# Patient Record
Sex: FEMALE | Race: WHITE | Employment: FULL TIME | URBAN - METROPOLITAN AREA
[De-identification: names, ages, dates, MRNs, and addresses within clinical notes are randomized per-mention and may not be internally consistent; named-entity substitution may affect disease eponyms.]

---

## 2021-05-02 ENCOUNTER — APPOINTMENT (OUTPATIENT)
Dept: CT IMAGING | Age: 31
DRG: 812 | End: 2021-05-02
Payer: MEDICAID

## 2021-05-02 ENCOUNTER — APPOINTMENT (OUTPATIENT)
Dept: GENERAL RADIOLOGY | Age: 31
DRG: 812 | End: 2021-05-02
Payer: MEDICAID

## 2021-05-02 ENCOUNTER — HOSPITAL ENCOUNTER (INPATIENT)
Age: 31
LOS: 1 days | Discharge: LEFT BEFORE DISCHARGE | DRG: 812 | End: 2021-05-03
Attending: EMERGENCY MEDICINE | Admitting: INTERNAL MEDICINE
Payer: MEDICAID

## 2021-05-02 DIAGNOSIS — R11.2 NAUSEA VOMITING AND DIARRHEA: ICD-10-CM

## 2021-05-02 DIAGNOSIS — R19.7 NAUSEA VOMITING AND DIARRHEA: ICD-10-CM

## 2021-05-02 DIAGNOSIS — R79.89 ELEVATED LACTIC ACID LEVEL: ICD-10-CM

## 2021-05-02 DIAGNOSIS — F19.10 DRUG ABUSE (HCC): Primary | ICD-10-CM

## 2021-05-02 DIAGNOSIS — R00.0 TACHYCARDIA: ICD-10-CM

## 2021-05-02 DIAGNOSIS — K82.8 THICKENING OF WALL OF GALLBLADDER: ICD-10-CM

## 2021-05-02 LAB
A/G RATIO: 1.7 (ref 1.1–2.2)
ALBUMIN SERPL-MCNC: 4.3 G/DL (ref 3.4–5)
ALP BLD-CCNC: 60 U/L (ref 40–129)
ALT SERPL-CCNC: 32 U/L (ref 10–40)
AMPHETAMINE SCREEN, URINE: POSITIVE
ANION GAP SERPL CALCULATED.3IONS-SCNC: 11 MMOL/L (ref 3–16)
AST SERPL-CCNC: 30 U/L (ref 15–37)
BACTERIA: ABNORMAL /HPF
BARBITURATE SCREEN URINE: ABNORMAL
BASOPHILS ABSOLUTE: 0 K/UL (ref 0–0.2)
BASOPHILS RELATIVE PERCENT: 0.2 %
BENZODIAZEPINE SCREEN, URINE: ABNORMAL
BILIRUB SERPL-MCNC: 1 MG/DL (ref 0–1)
BILIRUBIN URINE: NEGATIVE
BLOOD, URINE: NEGATIVE
BUN BLDV-MCNC: 27 MG/DL (ref 7–20)
CALCIUM SERPL-MCNC: 8.6 MG/DL (ref 8.3–10.6)
CANNABINOID SCREEN URINE: ABNORMAL
CHLORIDE BLD-SCNC: 102 MMOL/L (ref 99–110)
CLARITY: ABNORMAL
CO2: 28 MMOL/L (ref 21–32)
COCAINE METABOLITE SCREEN URINE: ABNORMAL
COLOR: YELLOW
COMMENT UA: ABNORMAL
CREAT SERPL-MCNC: 1 MG/DL (ref 0.6–1.1)
EOSINOPHILS ABSOLUTE: 0 K/UL (ref 0–0.6)
EOSINOPHILS RELATIVE PERCENT: 0.3 %
EPITHELIAL CELLS, UA: 5 /HPF (ref 0–5)
GFR AFRICAN AMERICAN: >60
GFR NON-AFRICAN AMERICAN: >60
GLOBULIN: 2.6 G/DL
GLUCOSE BLD-MCNC: 89 MG/DL (ref 70–99)
GLUCOSE URINE: NEGATIVE MG/DL
HCG QUALITATIVE: NEGATIVE
HCT VFR BLD CALC: 38.4 % (ref 36–48)
HEMOGLOBIN: 13.6 G/DL (ref 12–16)
HYALINE CASTS: 4 /LPF (ref 0–8)
KETONES, URINE: NEGATIVE MG/DL
LEUKOCYTE ESTERASE, URINE: ABNORMAL
LIPASE: 43 U/L (ref 13–60)
LYMPHOCYTES ABSOLUTE: 0.3 K/UL (ref 1–5.1)
LYMPHOCYTES RELATIVE PERCENT: 6.4 %
Lab: ABNORMAL
MCH RBC QN AUTO: 32.2 PG (ref 26–34)
MCHC RBC AUTO-ENTMCNC: 35.4 G/DL (ref 31–36)
MCV RBC AUTO: 91.1 FL (ref 80–100)
METHADONE SCREEN, URINE: ABNORMAL
MICROSCOPIC EXAMINATION: YES
MONOCYTES ABSOLUTE: 0 K/UL (ref 0–1.3)
MONOCYTES RELATIVE PERCENT: 0.6 %
NEUTROPHILS ABSOLUTE: 3.7 K/UL (ref 1.7–7.7)
NEUTROPHILS RELATIVE PERCENT: 92.5 %
NITRITE, URINE: NEGATIVE
OPIATE SCREEN URINE: ABNORMAL
OXYCODONE URINE: ABNORMAL
PDW BLD-RTO: 13 % (ref 12.4–15.4)
PH UA: 6
PH UA: 6 (ref 5–8)
PHENCYCLIDINE SCREEN URINE: ABNORMAL
PLATELET # BLD: 232 K/UL (ref 135–450)
PMV BLD AUTO: 7 FL (ref 5–10.5)
POTASSIUM SERPL-SCNC: 4.6 MMOL/L (ref 3.5–5.1)
PROPOXYPHENE SCREEN: ABNORMAL
PROTEIN UA: 100 MG/DL
RBC # BLD: 4.21 M/UL (ref 4–5.2)
RBC UA: ABNORMAL /HPF (ref 0–4)
SODIUM BLD-SCNC: 141 MMOL/L (ref 136–145)
SPECIFIC GRAVITY UA: 1.01 (ref 1–1.03)
TOTAL CK: 64 U/L (ref 26–192)
TOTAL PROTEIN: 6.9 G/DL (ref 6.4–8.2)
TROPONIN: <0.01 NG/ML
URINE REFLEX TO CULTURE: YES
URINE TYPE: ABNORMAL
UROBILINOGEN, URINE: 1 E.U./DL
WBC # BLD: 4.3 K/UL (ref 4–11)
WBC UA: 24 /HPF (ref 0–5)

## 2021-05-02 PROCEDURE — 87077 CULTURE AEROBIC IDENTIFY: CPT

## 2021-05-02 PROCEDURE — 99284 EMERGENCY DEPT VISIT MOD MDM: CPT

## 2021-05-02 PROCEDURE — 85025 COMPLETE CBC W/AUTO DIFF WBC: CPT

## 2021-05-02 PROCEDURE — 2580000003 HC RX 258: Performed by: PHYSICIAN ASSISTANT

## 2021-05-02 PROCEDURE — 74177 CT ABD & PELVIS W/CONTRAST: CPT

## 2021-05-02 PROCEDURE — 84703 CHORIONIC GONADOTROPIN ASSAY: CPT

## 2021-05-02 PROCEDURE — 82550 ASSAY OF CK (CPK): CPT

## 2021-05-02 PROCEDURE — 96375 TX/PRO/DX INJ NEW DRUG ADDON: CPT

## 2021-05-02 PROCEDURE — 80307 DRUG TEST PRSMV CHEM ANLYZR: CPT

## 2021-05-02 PROCEDURE — 71260 CT THORAX DX C+: CPT

## 2021-05-02 PROCEDURE — 80053 COMPREHEN METABOLIC PANEL: CPT

## 2021-05-02 PROCEDURE — 87086 URINE CULTURE/COLONY COUNT: CPT

## 2021-05-02 PROCEDURE — 83690 ASSAY OF LIPASE: CPT

## 2021-05-02 PROCEDURE — 6360000004 HC RX CONTRAST MEDICATION: Performed by: PHYSICIAN ASSISTANT

## 2021-05-02 PROCEDURE — 93005 ELECTROCARDIOGRAM TRACING: CPT | Performed by: PHYSICIAN ASSISTANT

## 2021-05-02 PROCEDURE — 81001 URINALYSIS AUTO W/SCOPE: CPT

## 2021-05-02 PROCEDURE — 84484 ASSAY OF TROPONIN QUANT: CPT

## 2021-05-02 PROCEDURE — 71045 X-RAY EXAM CHEST 1 VIEW: CPT

## 2021-05-02 PROCEDURE — 6360000002 HC RX W HCPCS: Performed by: PHYSICIAN ASSISTANT

## 2021-05-02 PROCEDURE — 87186 SC STD MICRODIL/AGAR DIL: CPT

## 2021-05-02 RX ORDER — ONDANSETRON 2 MG/ML
4 INJECTION INTRAMUSCULAR; INTRAVENOUS ONCE
Status: COMPLETED | OUTPATIENT
Start: 2021-05-02 | End: 2021-05-02

## 2021-05-02 RX ORDER — 0.9 % SODIUM CHLORIDE 0.9 %
1000 INTRAVENOUS SOLUTION INTRAVENOUS ONCE
Status: COMPLETED | OUTPATIENT
Start: 2021-05-02 | End: 2021-05-03

## 2021-05-02 RX ORDER — LORAZEPAM 2 MG/ML
1 INJECTION INTRAMUSCULAR ONCE
Status: COMPLETED | OUTPATIENT
Start: 2021-05-02 | End: 2021-05-03

## 2021-05-02 RX ADMIN — SODIUM CHLORIDE 1000 ML: 9 INJECTION, SOLUTION INTRAVENOUS at 22:36

## 2021-05-02 RX ADMIN — ONDANSETRON 4 MG: 2 INJECTION INTRAMUSCULAR; INTRAVENOUS at 22:36

## 2021-05-03 ENCOUNTER — APPOINTMENT (OUTPATIENT)
Dept: CT IMAGING | Age: 31
DRG: 812 | End: 2021-05-03
Payer: MEDICAID

## 2021-05-03 VITALS
DIASTOLIC BLOOD PRESSURE: 52 MMHG | WEIGHT: 128.31 LBS | BODY MASS INDEX: 20.62 KG/M2 | TEMPERATURE: 97.4 F | RESPIRATION RATE: 18 BRPM | SYSTOLIC BLOOD PRESSURE: 89 MMHG | OXYGEN SATURATION: 97 % | HEIGHT: 66 IN | HEART RATE: 118 BPM

## 2021-05-03 PROBLEM — I95.9 HYPOTENSION: Status: ACTIVE | Noted: 2021-05-03

## 2021-05-03 LAB
EKG ATRIAL RATE: 124 BPM
EKG DIAGNOSIS: NORMAL
EKG P AXIS: 84 DEGREES
EKG P-R INTERVAL: 124 MS
EKG Q-T INTERVAL: 296 MS
EKG QRS DURATION: 72 MS
EKG QTC CALCULATION (BAZETT): 425 MS
EKG R AXIS: 83 DEGREES
EKG T AXIS: 0 DEGREES
EKG VENTRICULAR RATE: 124 BPM
LACTIC ACID: 1.7 MMOL/L (ref 0.4–2)
LACTIC ACID: 2.5 MMOL/L (ref 0.4–2)
LACTIC ACID: 2.8 MMOL/L (ref 0.4–2)
LACTIC ACID: 4.5 MMOL/L (ref 0.4–2)

## 2021-05-03 PROCEDURE — 6360000002 HC RX W HCPCS: Performed by: PHYSICIAN ASSISTANT

## 2021-05-03 PROCEDURE — 96375 TX/PRO/DX INJ NEW DRUG ADDON: CPT

## 2021-05-03 PROCEDURE — G0378 HOSPITAL OBSERVATION PER HR: HCPCS

## 2021-05-03 PROCEDURE — 96366 THER/PROPH/DIAG IV INF ADDON: CPT

## 2021-05-03 PROCEDURE — 96361 HYDRATE IV INFUSION ADD-ON: CPT

## 2021-05-03 PROCEDURE — 87040 BLOOD CULTURE FOR BACTERIA: CPT

## 2021-05-03 PROCEDURE — 2060000000 HC ICU INTERMEDIATE R&B

## 2021-05-03 PROCEDURE — 2580000003 HC RX 258: Performed by: PHYSICIAN ASSISTANT

## 2021-05-03 PROCEDURE — 2580000003 HC RX 258: Performed by: INTERNAL MEDICINE

## 2021-05-03 PROCEDURE — 83605 ASSAY OF LACTIC ACID: CPT

## 2021-05-03 PROCEDURE — 6360000004 HC RX CONTRAST MEDICATION: Performed by: PHYSICIAN ASSISTANT

## 2021-05-03 PROCEDURE — 70450 CT HEAD/BRAIN W/O DYE: CPT

## 2021-05-03 PROCEDURE — 6360000002 HC RX W HCPCS: Performed by: INTERNAL MEDICINE

## 2021-05-03 PROCEDURE — 96365 THER/PROPH/DIAG IV INF INIT: CPT

## 2021-05-03 PROCEDURE — 96372 THER/PROPH/DIAG INJ SC/IM: CPT

## 2021-05-03 PROCEDURE — 87150 DNA/RNA AMPLIFIED PROBE: CPT

## 2021-05-03 PROCEDURE — 93010 ELECTROCARDIOGRAM REPORT: CPT | Performed by: INTERNAL MEDICINE

## 2021-05-03 PROCEDURE — 36415 COLL VENOUS BLD VENIPUNCTURE: CPT

## 2021-05-03 RX ORDER — POLYETHYLENE GLYCOL 3350 17 G/17G
17 POWDER, FOR SOLUTION ORAL DAILY PRN
Status: DISCONTINUED | OUTPATIENT
Start: 2021-05-03 | End: 2021-05-03 | Stop reason: HOSPADM

## 2021-05-03 RX ORDER — ONDANSETRON 2 MG/ML
4 INJECTION INTRAMUSCULAR; INTRAVENOUS EVERY 4 HOURS PRN
Status: DISCONTINUED | OUTPATIENT
Start: 2021-05-03 | End: 2021-05-03 | Stop reason: HOSPADM

## 2021-05-03 RX ORDER — SODIUM CHLORIDE 9 MG/ML
INJECTION, SOLUTION INTRAVENOUS CONTINUOUS
Status: DISCONTINUED | OUTPATIENT
Start: 2021-05-03 | End: 2021-05-03 | Stop reason: HOSPADM

## 2021-05-03 RX ORDER — SODIUM CHLORIDE 0.9 % (FLUSH) 0.9 %
10 SYRINGE (ML) INJECTION EVERY 12 HOURS SCHEDULED
Status: DISCONTINUED | OUTPATIENT
Start: 2021-05-03 | End: 2021-05-03 | Stop reason: HOSPADM

## 2021-05-03 RX ORDER — ACETAMINOPHEN 325 MG/1
650 TABLET ORAL EVERY 4 HOURS PRN
Status: DISCONTINUED | OUTPATIENT
Start: 2021-05-03 | End: 2021-05-03 | Stop reason: HOSPADM

## 2021-05-03 RX ORDER — SODIUM CHLORIDE 9 MG/ML
25 INJECTION, SOLUTION INTRAVENOUS PRN
Status: DISCONTINUED | OUTPATIENT
Start: 2021-05-03 | End: 2021-05-03 | Stop reason: HOSPADM

## 2021-05-03 RX ORDER — ACETAMINOPHEN 650 MG/1
650 SUPPOSITORY RECTAL EVERY 4 HOURS PRN
Status: DISCONTINUED | OUTPATIENT
Start: 2021-05-03 | End: 2021-05-03 | Stop reason: HOSPADM

## 2021-05-03 RX ORDER — 0.9 % SODIUM CHLORIDE 0.9 %
1000 INTRAVENOUS SOLUTION INTRAVENOUS ONCE
Status: COMPLETED | OUTPATIENT
Start: 2021-05-03 | End: 2021-05-03

## 2021-05-03 RX ORDER — NICOTINE 21 MG/24HR
1 PATCH, TRANSDERMAL 24 HOURS TRANSDERMAL DAILY
Status: DISCONTINUED | OUTPATIENT
Start: 2021-05-03 | End: 2021-05-03 | Stop reason: HOSPADM

## 2021-05-03 RX ORDER — SODIUM CHLORIDE 0.9 % (FLUSH) 0.9 %
10 SYRINGE (ML) INJECTION PRN
Status: DISCONTINUED | OUTPATIENT
Start: 2021-05-03 | End: 2021-05-03 | Stop reason: HOSPADM

## 2021-05-03 RX ADMIN — PIPERACILLIN SODIUM AND TAZOBACTAM SODIUM 3375 MG: 3; .375 INJECTION, POWDER, FOR SOLUTION INTRAVENOUS at 01:32

## 2021-05-03 RX ADMIN — SODIUM CHLORIDE 1000 ML: 9 INJECTION, SOLUTION INTRAVENOUS at 02:57

## 2021-05-03 RX ADMIN — LORAZEPAM 1 MG: 2 INJECTION INTRAMUSCULAR; INTRAVENOUS at 00:28

## 2021-05-03 RX ADMIN — SODIUM CHLORIDE 1000 ML: 9 INJECTION, SOLUTION INTRAVENOUS at 00:28

## 2021-05-03 RX ADMIN — ENOXAPARIN SODIUM 40 MG: 40 INJECTION SUBCUTANEOUS at 09:20

## 2021-05-03 RX ADMIN — SODIUM CHLORIDE: 9 INJECTION, SOLUTION INTRAVENOUS at 06:36

## 2021-05-03 RX ADMIN — PIPERACILLIN AND TAZOBACTAM 3375 MG: 3; .375 INJECTION, POWDER, LYOPHILIZED, FOR SOLUTION INTRAVENOUS at 09:20

## 2021-05-03 RX ADMIN — IOPAMIDOL 75 ML: 755 INJECTION, SOLUTION INTRAVENOUS at 00:00

## 2021-05-03 ASSESSMENT — ENCOUNTER SYMPTOMS
NAUSEA: 1
VOMITING: 1
COLOR CHANGE: 1
ABDOMINAL PAIN: 1
DIARRHEA: 1
SHORTNESS OF BREATH: 1

## 2021-05-03 ASSESSMENT — PAIN SCALES - GENERAL: PAINLEVEL_OUTOF10: 0

## 2021-05-03 NOTE — H&P
Hospital Medicine History & Physical      PCP: No primary care provider on file. Date of Admission: 5/2/2021    Date of Service: Pt seen/examined on 5/3/21 and Admitted to Inpatient     Chief Complaint: Nausea and vomiting    History Of Present Illness: The patient is a 27 y.o. female who presents to [facility] with nausea and vomiting. Patient states that yesterday she injected ice and shortly thereafter started to have nausea vomiting along with diarrhea. She denies getting ice from a new source. She also denies using any sort of opioids at that moment. She says her last use of IV heroin was 7 days ago. In the emergency department patient was noted to be afebrile, regular respirations, tachycardic with episodes of hypotension despite IV fluid resuscitation. Patient's lactic acid elevated at 4.5 and trended down to 1.7 after 3 L of fluid. Urine drug screen was positive for amphetamines. Urinalysis concerning for possible UTI. CT of the head without any acute pathology. CT of the chest abdomen and pelvis with rectal wall thickening along with a sending colon wall thickening, thickened appearance of the gallbladder wall, thickening of the distal esophagus. Negative for any lung pathology. Patient was admitted into the hospital and started on broad-spectrum IV antibiotics along with IV fluids. Past Medical History:    History reviewed. No pertinent past medical history. Past Surgical History:    History reviewed. No pertinent surgical history. Medications Prior to Admission:    Prior to Admission medications    Medication Sig Start Date End Date Taking? Authorizing Provider   etonogestrel (NEXPLANON) 68 MG implant 68 mg by Subdermal route once    Yes Historical Provider, MD       Allergies:  Patient has no known allergies. Social History:   The interpretation: No acute pathology    CT of the chest personally reviewed without any acute pathology    CT of the abdomen and pelvis personally reviewed with thickening of the rectum    EKG:  I have reviewed the EKG with the following interpretation: Sinus tachycardia with possible left atrial enlargement, voltage criteria for LVH    CBC   Recent Labs     05/02/21 2233   WBC 4.3   HGB 13.6   HCT 38.4         RENAL  Recent Labs     05/02/21 2233      K 4.6      CO2 28   BUN 27*   CREATININE 1.0     LFT'S  Recent Labs     05/02/21 2233   AST 30   ALT 32   BILITOT 1.0   ALKPHOS 60     COAG  No results for input(s): INR in the last 72 hours.   CARDIAC ENZYMES  Recent Labs     05/02/21 2233   CKTOTAL 64   TROPONINI <0.01       U/A:    Lab Results   Component Value Date    COLORU YELLOW 05/02/2021    WBCUA 24 05/02/2021    RBCUA 0-2 05/02/2021    MUCUS Trace 02/21/2011    BACTERIA 2+ 05/02/2021    CLARITYU CLOUDY 05/02/2021    SPECGRAV 1.009 05/02/2021    LEUKOCYTESUR SMALL 05/02/2021    BLOODU Negative 05/02/2021    GLUCOSEU Negative 05/02/2021    GLUCOSEU NEGATIVE 02/21/2011       ABG  No results found for: ENZ4XTI, BEART, B3DWYEXH, PHART, THGBART, EPM3UKI, PO2ART, XMU6FZV      PHYSICIANS CERTIFICATION:    I certify that Mary Amador is expected to be hospitalized for more than 2 midnights based on the following assessment and plan:      ASSESSMENT/PLAN:    Nausea vomiting, diarrhea  Possible drug reaction from methamphetamines or opioid withdrawal  Could also be infectious process  Continue supportive care with IV fluids  Antinausea meds  CT with colonic wall thickening, esophageal thickening  GI consulted    SIRS   continue IV fluids  No source of infection    Methamphetamine intoxication  Resolving  Continue supportive care    Abnormal urinalysis  No signs or symptoms  Monitor      DVT Prophylaxis: Lovenox  Diet: DIET GENERAL;  Code Status: Full Code  PT/OT Eval Status: Not applicable    Dispo -inpatient       Cinthia Mixon MD    Thank you No primary care provider on file. for the opportunity to be involved in this patient's care. If you have any questions or concerns please feel free to contact me at [phone].

## 2021-05-03 NOTE — PROGRESS NOTES
Patient arrived to room 5103 from ED. Pt ambulated over to the bed. Telemetry applied to patient and verified with [facility]. Box #32. Vital signs taken, view flow sheets. Patient alert and oriented x4. Patient oriented to room and use of call light. Patient is able to get up ad joe but was advised to call if she feels uncomfortable to do so at any point. Call light and personal belongings in reach. Bed alarm on. Plan of care reviewed with patient. Patient denied any further needs and questions at this time. Will continue to monitor.      Electronically signed by Addie Osman RN on 5/3/2021 at 6:10 AM

## 2021-05-03 NOTE — ED NOTES
Pt presents to ED via EMS from home for complaints of chest pain and nausea. Pt reports that she injected meth about an hour prior to arrival when she started with symptoms. Pt also it a heroin user and last use was 7 days ago. Pt is alert and oriented x4. Sinus tach, HR 140s on monitor, /89, 99% on room air. Safety precautions in place. Call light within reach.       Abdi Echeverria RN  05/02/21 0497

## 2021-05-03 NOTE — ED PROVIDER NOTES
vomiting. Musculoskeletal: Positive for arthralgias and myalgias. Skin: Positive for color change (Mottled arms and legs). Neurological: Positive for headaches. Negative for weakness and numbness. Psychiatric/Behavioral: Negative for agitation and behavioral problems. The patient is nervous/anxious. Except as noted above the remainder of the review of systems was reviewed and negative. PAST MEDICAL HISTORY   History reviewed. No pertinent past medical history. SURGICAL HISTORY     History reviewed. No pertinent surgical history. CURRENT MEDICATIONS       Previous Medications    No medications on file       ALLERGIES     Patient has no known allergies. FAMILY HISTORY     History reviewed. No pertinent family history. No family status information on file. SOCIAL HISTORY      reports that she has been smoking. She has never used smokeless tobacco. She reports previous alcohol use. She reports current drug use. Drugs: IV, Methamphetamines, and Opiates . PHYSICAL EXAM    (up to 7 for level 4, 8 or more for level 5)     ED Triage Vitals [05/02/21 2146]   BP Temp Temp src Pulse Resp SpO2 Height Weight   (!) 83/43 98 °F (36.7 °C) -- 117 16 98 % -- --       Physical Exam  Vitals signs and nursing note reviewed. Constitutional:       Appearance: Normal appearance. HENT:      Head: Normocephalic. Eyes:      Pupils: Pupils are equal, round, and reactive to light. Cardiovascular:      Rate and Rhythm: Tachycardia present. Pulses: Normal pulses. Pulmonary:      Effort: No respiratory distress. Abdominal:      Tenderness: There is abdominal tenderness. There is no guarding. Musculoskeletal: Normal range of motion. Skin:     Capillary Refill: Capillary refill takes more than 3 seconds. Neurological:      General: No focal deficit present. Mental Status: She is alert and oriented to person, place, and time.    Psychiatric:         Mood and Affect: Mood normal. Behavior: Behavior normal.         DIAGNOSTIC RESULTS     EKG: All EKG's are interpreted by MOSES Cheng in the absence of a cardiologist.    EKG interpreted by myself - please refer to attending physician's note for complete EKG interpretation:    No evidence of acute ischemia or injury. RADIOLOGY:   Non-plain film images such as CT, Ultrasound and MRI are read by the radiologist. Plain radiographic images are visualized and preliminarily interpreted by MOSES Cheng with the below findings:    Reviewed radiologist's interpretation. Interpretation per the Radiologist below, if available at the time of this note:    CT HEAD WO CONTRAST   Final Result   No acute intracranial abnormality. CT ABDOMEN PELVIS W IV CONTRAST Additional Contrast? None   Final Result   1. Wall thickening of the rectum and ascending colon. Correlate with   presentation for colitis. 2. Nonspecific periportal edema. 3. Thickened appearance of the gallbladder wall. Correlate with presentation   to exclude cholangitis. 4. Questionable wall thickening of the distal esophagus. Correlate with   presentation. 5. Other findings as described. CT CHEST PULMONARY EMBOLISM W CONTRAST   Final Result   1. Suboptimal opacification of the pulmonary arteries. Artifact degraded   evaluation of the pulmonary arteries. No acute pulmonary embolism to the   segmental arteries. 2. Other findings as described. XR CHEST PORTABLE   Final Result   No airspace disease by radiograph.                LABS:  Labs Reviewed   CBC WITH AUTO DIFFERENTIAL - Abnormal; Notable for the following components:       Result Value    Lymphocytes Absolute 0.3 (*)     All other components within normal limits    Narrative:     Performed at:  [address] [address]   Phone [phone]   COMPREHENSIVE METABOLIC PANEL - Abnormal; Notable for the following components: BUN 27 (*)     All other components within normal limits    Narrative:     Performed at:  [facility]  [address] [place] [employer] [employer] [address]   Phone [phone]   URINE RT REFLEX TO CULTURE - Abnormal; Notable for the following components:    Clarity, UA CLOUDY (*)     Protein,  (*)     Leukocyte Esterase, Urine SMALL (*)     All other components within normal limits    Narrative:     Performed at:  [place] [address], [place] [employer] [employer] [address]   Phone [phone]   URINE DRUG SCREEN - Abnormal; Notable for the following components:    Amphetamine Screen, Urine POSITIVE (*)     All other components within normal limits    Narrative:     Performed at:  [address] [employer] [employer] [address]   Phone [phone]   MICROSCOPIC URINALYSIS - Abnormal; Notable for the following components:    Bacteria, UA 2+ (*)     WBC, UA 24 (*)     All other components within normal limits    Narrative:     Performed at:  [place] [address], [place] [employer] [employer] [address]   Phone [phone]   LACTIC ACID, PLASMA - Abnormal; Notable for the following components:    Lactic Acid 4.5 (*)     All other components within normal limits    Narrative:     Arvin Hatch,  Chemistry result called to Merline Shrestha RN, 05/03/2021 01:49, by [facility]  Performed at:  [facility]  [address] [employer] [address]   Phone [phone]   LACTIC ACID, PLASMA - Abnormal; Notable for the following components:    Lactic Acid 2.5 (*)     All other components within normal limits    Narrative:     Performed at:  [place] [address], [place] [employer] [employer] [address]   Phone [phone]   CULTURE, URINE   CULTURE, BLOOD 2   CULTURE, BLOOD 1   LIPASE    Narrative:     Performed at:  [facility]  [address] [place] [facility] [facility] [address]   Phone [phone]   TROPONIN    Narrative:     Performed at:  [address] [place] [address]  [address] [facility] [address]   Phone [phone]   HCG, SERUM, QUALITATIVE    Narrative:     Performed at:  [facility]  [address] [place] [address]   Phone [phone]   CK    Narrative:     Performed at:  [address] [place] [address]  [address] [facility] [address]   Phone [phone]       All other labs were within normal range or not returned as of this dictation. EMERGENCY DEPARTMENT COURSE and DIFFERENTIAL DIAGNOSIS/MDM:   Vitals:    Vitals:    05/03/21 0131 05/03/21 0147 05/03/21 0218 05/03/21 0231   BP: 121/77 116/64 (!) 108/57 127/63   Pulse: 134 135 128 126   Resp:       Temp:       SpO2: 99%  99% 100%     Patient is tachycardic, initially hypotensive but blood pressure came up to 108 without fluids. She was given fluids and ativan but continued to be tachycardic. She has possible colitis and gallbladder thickening on ct scan. Given Zosyn and will consult the hospitalist.    CONSULTS:  IP CONSULT TO HOSPITALIST  IP CONSULT TO GI    PROCEDURES:  Procedures      FINAL IMPRESSION      1. Drug abuse ([address]    2. Tachycardia    3. Nausea vomiting and diarrhea    4. Thickening of wall of gallbladder    5. Elevated lactic acid level          DISPOSITION/PLAN   DISPOSITION        PATIENT REFERRED TO:  No follow-up provider specified.     DISCHARGE MEDICATIONS:  New Prescriptions    No medications on file       (Please note that portions of this note were completed with a voice recognition program.  Efforts were made to edit the dictations but occasionally words are mis-transcribed.)    Nancy Adams, [address], [place]  05/03/21 8366

## 2021-05-03 NOTE — ED PROVIDER NOTES
Attending Supervisory Note/Shared Visit   I have personally performed a face to face diagnostic evaluation on this patient. I have reviewed the mid-levels findings and agree. History and Exam by me shows alert white female no acute distress. Patient is here after shooting methamphetamine about an hour and a half ago. She is having nausea, diarrhea, headache, chest and abdominal pain. She states she is feeling cold. Heart: Tachycardic, regular, no murmurs or gallops noted. Lungs: Breath sounds equal bilaterally and clear. Abdomen: Soft, nondistended, some mild diffuse periumbilical tenderness without guarding or rebound. Bowel sounds are normal.  Skin: Warm and dry. Mottling of her distal extremities with some poor capillary refill in her distal upper and lower extremities. EKG: Sinus tachycardia, rate of 124, left atrial enlargement, left ventricular hypertrophy. Nonspecific ST-T wave changes. Rhythm strip shows a sinus tachycardia with a rate of 124, AZ interval 124 ms, QRS 72 ms with no other ectopy as interpreted by me. No old EKG available for comparison. Lab reviewed. H&H are 13.6 and 38.4. White blood cell count 4300 with 93 neutrophils and 6 lymphs. Electrolytes are normal.  BUN of 27 with a creatinine 1.0. Liver enzymes are normal.  Lipase of 43. hCG is negative. CK of 64.       (Please note that portions of this note were completed with a voice recognition program.  Efforts were made to edit the dictations but occasionally words are mis-transcribed.)    Ryan Edwards MD  Attending Emergency Physician        Marlee Kocher, MD  05/02/21 3143

## 2021-05-03 NOTE — CONSULTS
GASTROENTEROLOGY INPATIENT CONSULTATION          IDENTIFYING DATA/REASON FOR CONSULTATION   PATIENT:  Cesia Hawthorne Code  MRN:  [id number]  ADMIT DATE: 5/2/2021  TIME OF EVALUATION: 5/3/2021 12:21 PM  HOSPITAL STAY:   LOS: 0 days     REASON FOR CONSULTATION:  N/V/Abd pain, Abnormal CT    HISTORY OF PRESENT ILLNESS   Cesia Hawthorne Code is a 27 y.o. female with no past medical history who presented on 5/2/2021 with acute onset of nausea, vomiting, abdominal pain, body aches starting shortly after injecting methamphetamine. Onset was acute, occurring 10 to 20 minutes after injection. She states the pain was throughout her entire abdomen. Denies blood in her vomit or stools. In the ED she was found to be hypotensive and tachycardic. CT A/P with IV contrast showed wall thickening of the rectum, ascending colon, distal esophagus. There was also a thickened appearance of the gallbladder wall. Blood work showed initial lactic acid of 4.5 which has since resolved. LFTs, lipase, white count normal.        PAST MEDICAL, SURGICAL, FAMILY, and SOCIAL HISTORY   History reviewed. No pertinent past medical history. History reviewed. No pertinent surgical history. History reviewed. No pertinent family history.   Social History     Socioeconomic History    Marital status: Single     Spouse name: None    Number of children: None    Years of education: None    Highest education level: None   Occupational History    None   Social Needs    Financial resource strain: None    Food insecurity     Worry: None     Inability: None    Transportation needs     Medical: None     Non-medical: None   Tobacco Use    Smoking status: Current Every Day Smoker    Smokeless tobacco: Never Used   Substance and Sexual Activity    Alcohol use: Not Currently    Drug use: Yes     Types: IV, Methamphetamines, Opiates     Sexual activity: None   Lifestyle    Physical activity     Days per week: None     Minutes per session: None    Stress: IMAGING   Laboratory   Recent Labs     05/02/21 2233   WBC 4.3   HGB 13.6   HCT 38.4   MCV 91.1        Recent Labs     05/02/21 2233      K 4.6      CO2 28   BUN 27*   CREATININE 1.0     Recent Labs     05/02/21 2233   AST 30   ALT 32   BILITOT 1.0   ALKPHOS 60     Recent Labs     05/02/21 2233   LIPASE 43.0     No results for input(s): PROTIME, INR in the last 72 hours. Imaging  CT HEAD WO CONTRAST   Final Result   No acute intracranial abnormality. CT ABDOMEN PELVIS W IV CONTRAST Additional Contrast? None   Final Result   1. Wall thickening of the rectum and ascending colon. Correlate with   presentation for colitis. 2. Nonspecific periportal edema. 3. Thickened appearance of the gallbladder wall. Correlate with presentation   to exclude cholangitis. 4. Questionable wall thickening of the distal esophagus. Correlate with   presentation. 5. Other findings as described. CT CHEST PULMONARY EMBOLISM W CONTRAST   Final Result   1. Suboptimal opacification of the pulmonary arteries. Artifact degraded   evaluation of the pulmonary arteries. No acute pulmonary embolism to the   segmental arteries. 2. Other findings as described. XR CHEST PORTABLE   Final Result   No airspace disease by radiograph. US GALLBLADDER RUQ    (Results Pending)         ASSESSMENT AND RECOMMENDATIONS   27 y.o. female with a history of IV drug use who presented 5/2/2021 acute onset of nausea, vomiting, abdominal pain, body aches starting shortly after injecting methamphetamine. CT A/P with IV contrast showed wall thickening of the rectum, ascending colon, distal esophagus. There was also a thickened appearance of the gallbladder wall. Blood work showed initial lactic acid of 4.5 which has since resolved. LFTs, lipase, white count normal.     IMPRESSION:  1. Nausea, vomiting, abdominal pain  -Suspect this is related to drug overdose.   She was hypotensive on arrival.  CT showed rectal and ascending colon wall thickening and blood work showed elevated lactic acid. Suspect these findings due to a possible ischemic event in the setting of hypotension and drug overdose. There was also possible distal esophageal wall thickening seen on CT which could be related to the vomiting. There ws also gallbladder wall thickening and a right upper quadrant ultrasound has been ordered for follow up. LFTs are normal.  She has been placed on empiric Zosyn        RECOMMENDATIONS:    Follow-up on right upper quadrant ultrasound She has no further nausea vomiting today. She states she is hungry and wants to eat. Lactic acid resolved. Will discuss case with Dr. Carmen Mcmanus. Please await his further input and recommendations    If you have any questions or need any further information, please feel free to contact our consult team.  Thank you for allowing us to participate in the care of Jessica Hooper Dr. The note was completed using Dragon voice recognition transcription. Every effort was made to ensure accuracy; however, inadvertent transcription errors may be present despite my best efforts to edit errors.       Liz Duval PA-C

## 2021-05-03 NOTE — CARE COORDINATION
SW consult acknowledged. INITIAL CASE MANAGEMENT ASSESSMENT     Reviewed chart, spoke with patient and significant other to assess possible discharge needs. Explained Case Management role/services. Living Situation: Patient lives in a house with significant other; 4 steps to enter. ADLs: Independent      DME: None     PT/OT Recs: Not ordered      Active Services: None      Transportation: Patient is an active ; significant other to transport home. Medications: [facility] on [address]    PCP: No PCP. Patient declined     HD/PD: n/a    PLAN/COMMENTS: Patient's significant other was asleep in the chair at bedside during discussion. Patient appeared minimally engaged in discussion and kept one eye open at all times. SW to follow up with patient with resources and speak with patient individually to verify plan. 1) Clinic list   2) Food resources - per RN     SW/CM provided contact information for patient or family to call with any questions. SW/CM will follow and assist as needed.     AMAURY Rangel, [facility], Social Work  [phone]  Electronically signed by AMAURY Rangel, [facility] on 5/3/2021 at 12:48 PM

## 2021-05-03 NOTE — PROGRESS NOTES
4 Eyes Skin Assessment     NAME:  Zena Zelaya Code  YOB: 1990  MEDICAL RECORD NUMBER:  [id number]    The patient is being assess for  Admission    I agree that 2 RN's have performed a thorough Head to Toe Skin Assessment on the patient. ALL assessment sites listed below have been assessed. Areas assessed by both nurses:    Head, Face, Ears, Shoulders, Back, Chest, Arms, Elbows, Hands, Sacrum. Buttock, Coccyx, Ischium and Legs. Feet and Heels        Does the Patient have a Wound?  No noted wound(s)       Saurabh Prevention initiated:  NA   Wound Care Orders initiated:  NA    Pressure Injury (Stage 3,4, Unstageable, DTI, NWPT, and Complex wounds) if present place consult order under [de-identified] NA    New and Established Ostomies if present place consult order under : NA      Nurse 1 eSignature: Electronically signed by Chloe Ortega RN on 5/3/21 at 6:08 AM EDT    **SHARE this note so that the co-signing nurse is able to place an eSignature**    Nurse 2 eSignature: Electronically signed by Mason Starks on 5/3/21 at 7:56 AM EDT

## 2021-05-03 NOTE — PROGRESS NOTES
Patient leaving AMA. Tele and IV removed. Explained that it is against medical advice. Plans to ambulate out.

## 2021-05-03 NOTE — PROGRESS NOTES
Comprehensive Nutrition Assessment    Type and Reason for Visit:  Initial, Positive Nutrition Screen    Nutrition Recommendations/Plan:   General diet   Ensure BID  Will monitor nutritional adequacy, nutrition-related labs, weights, BMs, and clinical progress     Nutrition Assessment:  + Screen MST 2. Pt presented with nausea, vomiting, diarrhea after injecting \"ice\" per H&P. Attempted to see patient, drowsy during visit and did not provide much history. Currently on General diet, no intakes recorded. No weights available in EMR to monitor trends. Will trial ONS. Malnutrition Assessment:  Malnutrition Status:  Insufficient data      Estimated Daily Nutrient Needs:  Energy (kcal):  3091-9692 kcal (25-30 kcal/kg ABW); Weight Used for Energy Requirements:  Current     Protein (g):  58-70 gm (1-1.2 gm/kg ABW); Weight Used for Protein Requirements:  Current        Fluid (ml/day):   ; Method Used for Fluid Requirements:  1 ml/kcal      Nutrition Related Findings:  no edema      Wounds:  None       Current Nutrition Therapies:    DIET GENERAL; Anthropometric Measures:  · Height: 5' 6\" (167.6 cm)  · Current Body Weight: 128 lb (58.1 kg)   · Admission Body Weight: 128 lb (58.1 kg)    · Usual Body Weight: (ASHLEY)     · Ideal Body Weight: 130 lbs; % Ideal Body Weight 98.5 %   · BMI: 20.7  · Adjusted Body Weight:  ; No Adjustment   · BMI Categories: Normal Weight (BMI 18.5-24. 9)       Nutrition Diagnosis:   · Inadequate oral intake related to altered GI function as evidenced by intake 0-25%      Nutrition Interventions:   Food and/or Nutrient Delivery:  Continue Current Diet, Start Oral Nutrition Supplement  Nutrition Education/Counseling:  No recommendation at this time   Coordination of Nutrition Care:  Continue to monitor while inpatient    Goals:   Tolerate diet and consume greater than 50% of meals and supplements this admission       Nutrition Monitoring and Evaluation:   Behavioral-Environmental Outcomes:  None Identified   Food/Nutrient Intake Outcomes:  Food and Nutrient Intake, Supplement Intake  Physical Signs/Symptoms Outcomes:  Biochemical Data, Nutrition Focused Physical Findings, Skin, Weight, GI Status     Discharge Planning:    No discharge needs at this time     Electronically signed by Rosaura Edwards RD, LD on 5/3/21 at 2:10 PM EDT    Contact: [phone]

## 2021-05-03 NOTE — SIGNIFICANT EVENT
Verna Dotson Code a 80-year-old female drug user who injected IV methamphetamine prior to arrival and experienced sudden Onset symptoms of vomiting, diarrhea, chills, and abdominal pain. She was hypotensive and is persistently tachycardic at 135 to 140 despite several liters of fluid and ativan, lactic acid is 4.5. Her CT scan shows questionable colitis and gallbladder wall thickening. I gave her some zosyn      Pt reports taking a mixture of IV drugs which included a methamphetamine and ? Moises Decent She was hypotensive on arrival. Concerning that she may have taken a \"speedball\" which includes an amphetamine and a narcotic (Carfentanil doesn't show up on drug screen). Possible hypotension resulting in multiple abnormalities seen in GI system on CT? But without effects of hypotension on CT of the head or on renal system.

## 2021-05-04 LAB
ORGANISM: ABNORMAL
REPORT: NORMAL
URINE CULTURE, ROUTINE: ABNORMAL

## 2021-05-06 LAB
BLOOD CULTURE, ROUTINE: ABNORMAL
BLOOD CULTURE, ROUTINE: ABNORMAL
ORGANISM: ABNORMAL

## 2021-05-07 LAB — CULTURE, BLOOD 2: NORMAL

## 2025-07-27 ENCOUNTER — HOSPITAL ENCOUNTER (EMERGENCY)
Age: 35
Discharge: HOME | End: 2025-07-27
Attending: STUDENT IN AN ORGANIZED HEALTH CARE EDUCATION/TRAINING PROGRAM
Payer: COMMERCIAL

## 2025-07-27 VITALS
TEMPERATURE: 98.4 F | DIASTOLIC BLOOD PRESSURE: 60 MMHG | SYSTOLIC BLOOD PRESSURE: 133 MMHG | HEIGHT: 66 IN | OXYGEN SATURATION: 100 % | BODY MASS INDEX: 24.77 KG/M2 | HEART RATE: 90 BPM | WEIGHT: 154.1 LBS | RESPIRATION RATE: 18 BRPM

## 2025-07-27 DIAGNOSIS — L03.116 CELLULITIS OF LEFT LOWER EXTREMITY: Primary | ICD-10-CM

## 2025-07-27 PROCEDURE — 99283 EMERGENCY DEPT VISIT LOW MDM: CPT

## 2025-07-27 PROCEDURE — 6370000000 HC RX 637 (ALT 250 FOR IP): Performed by: STUDENT IN AN ORGANIZED HEALTH CARE EDUCATION/TRAINING PROGRAM

## 2025-07-27 RX ORDER — CEPHALEXIN 500 MG/1
500 CAPSULE ORAL ONCE
Status: COMPLETED | OUTPATIENT
Start: 2025-07-27 | End: 2025-07-27

## 2025-07-27 RX ORDER — SULFAMETHOXAZOLE AND TRIMETHOPRIM 800; 160 MG/1; MG/1
1 TABLET ORAL ONCE
Status: COMPLETED | OUTPATIENT
Start: 2025-07-27 | End: 2025-07-27

## 2025-07-27 RX ORDER — SULFAMETHOXAZOLE AND TRIMETHOPRIM 800; 160 MG/1; MG/1
1 TABLET ORAL 2 TIMES DAILY
Qty: 14 TABLET | Refills: 0 | Status: SHIPPED | OUTPATIENT
Start: 2025-07-27 | End: 2025-08-03

## 2025-07-27 RX ORDER — CEPHALEXIN 500 MG/1
500 CAPSULE ORAL 4 TIMES DAILY
Qty: 28 CAPSULE | Refills: 0 | Status: SHIPPED | OUTPATIENT
Start: 2025-07-27 | End: 2025-08-03

## 2025-07-27 RX ADMIN — SULFAMETHOXAZOLE AND TRIMETHOPRIM 1 TABLET: 800; 160 TABLET ORAL at 13:01

## 2025-07-27 RX ADMIN — CEPHALEXIN 500 MG: 500 CAPSULE ORAL at 13:01

## 2025-07-27 ASSESSMENT — PAIN DESCRIPTION - LOCATION
LOCATION: LEG
LOCATION: LEG

## 2025-07-27 ASSESSMENT — PAIN DESCRIPTION - ORIENTATION
ORIENTATION: LEFT
ORIENTATION: LEFT

## 2025-07-27 ASSESSMENT — PAIN DESCRIPTION - PAIN TYPE
TYPE: ACUTE PAIN
TYPE: ACUTE PAIN

## 2025-07-27 ASSESSMENT — PAIN DESCRIPTION - FREQUENCY
FREQUENCY: CONTINUOUS
FREQUENCY: CONTINUOUS

## 2025-07-27 ASSESSMENT — PAIN DESCRIPTION - DESCRIPTORS
DESCRIPTORS: ACHING;DULL
DESCRIPTORS: ACHING;SHOOTING

## 2025-07-27 ASSESSMENT — PAIN SCALES - GENERAL
PAINLEVEL_OUTOF10: 4
PAINLEVEL_OUTOF10: 5

## 2025-07-27 ASSESSMENT — PAIN - FUNCTIONAL ASSESSMENT
PAIN_FUNCTIONAL_ASSESSMENT: ACTIVITIES ARE NOT PREVENTED
PAIN_FUNCTIONAL_ASSESSMENT: ACTIVITIES ARE NOT PREVENTED

## 2025-07-27 NOTE — ED TRIAGE NOTES
Pt c/o pain to L upper thigh with redness that is spreading. Pt states she believes she was bit by a spider approx 3 days prior. States that increased pain, and redness began yesterday. Denies N/V/D or chills.

## 2025-07-27 NOTE — DISCHARGE INSTRUCTIONS
Take antibiotics as directed.  Use Tylenol and ibuprofen as needed for pain.  Hydrate aggressively.  If your symptoms worsen or you develop new, concerning symptoms, please return to the emergency department.  Please follow-up with your PCP this week.

## 2025-07-27 NOTE — ED PROVIDER NOTES
History of Present Illness       Gato Amador is a 34 y.o. female with a   Past Medical History:   Diagnosis Date    ESBL (extended spectrum beta-lactamase) producing bacteria infection 05/02/2021    urine    who presents to the emergency department today with pain and swelling on her leg.  Says she had a spider bite 3 days ago.  Noticed redness and swelling in the left upper thigh that has been spreading and worsening.  Patient reports some pain in this area.  Denies fever.      PMH     No family history on file.  Current Facility-Administered Medications   Medication Dose Route Frequency Provider Last Rate Last Admin    cephALEXin (KEFLEX) capsule 500 mg  500 mg Oral Once Lamont Hameed MD        sulfamethoxazole-trimethoprim (BACTRIM DS;SEPTRA DS) 800-160 MG per tablet 1 tablet  1 tablet Oral Once Lamont Hameed MD         Current Outpatient Medications   Medication Sig Dispense Refill    cephALEXin (KEFLEX) 500 MG capsule Take 1 capsule by mouth 4 times daily for 7 days 28 capsule 0    sulfamethoxazole-trimethoprim (BACTRIM DS) 800-160 MG per tablet Take 1 tablet by mouth 2 times daily for 7 days 14 tablet 0    etonogestrel (NEXPLANON) 68 MG implant 68 mg by Subdermal route once        No Known Allergies  Social History     Socioeconomic History    Marital status: Single     Spouse name: Not on file    Number of children: Not on file    Years of education: Not on file    Highest education level: Not on file   Occupational History    Not on file   Tobacco Use    Smoking status: Every Day    Smokeless tobacco: Never   Substance and Sexual Activity    Alcohol use: Not Currently    Drug use: Yes     Types: IV, Methamphetamines (Crystal Meth), Opiates     Sexual activity: Not on file   Other Topics Concern    Not on file   Social History Narrative    Not on file     Social Drivers of Health     Financial Resource Strain: Not on file   Food Insecurity: Not on file   Transportation Needs: Not on file

## 2025-07-29 ENCOUNTER — TELEMEDICINE ON DEMAND (OUTPATIENT)
Age: 35
End: 2025-07-29
Payer: COMMERCIAL

## 2025-07-29 DIAGNOSIS — T63.301A SPIDER BITE WOUND, ACCIDENTAL OR UNINTENTIONAL, INITIAL ENCOUNTER: Primary | ICD-10-CM

## 2025-07-29 DIAGNOSIS — L03.116 CELLULITIS OF LEFT THIGH: ICD-10-CM

## 2025-07-29 PROCEDURE — 99202 OFFICE O/P NEW SF 15 MIN: CPT | Performed by: NURSE PRACTITIONER

## 2025-07-29 NOTE — PROGRESS NOTES
Gato Amador, was evaluated through a synchronous (real-time) audio-video encounter. The patient (or guardian if applicable) is aware that this is a billable service, which includes applicable co-pays. This Virtual Visit was conducted with patient's (and/or legal guardian's) consent. Patient identification was verified, and a caregiver was present when appropriate.   The patient was located at Home: [address] [place] [address]  Provider was located at Home (Appt Dept State): [place]  Confirm you are appropriately licensed, registered, or certified to deliver care in the state where the patient is located as indicated above. If you are not or unsure, please re-schedule the visit: Yes, I confirm.     Gato Amador (:  1990) is a Established patient, presenting virtually for evaluation of the following:    Cellulitis/questionable spider bite on left upper thigh  Treated on 727 in the ER  Has no current PCP      Below is the assessment and plan developed based on review of pertinent history, physical exam, labs, studies, and medications.    Assessment & Plan  Spider bite wound, accidental or unintentional, initial encounter   Problem    Currently taking Keflex and Bactrim DS  She was advised to go to the ER for further evaluation  Cellulitis of left thigh   Problem    See above    Currently has no PCP  Link provided    We see that you do not have a Primary Care Provider (PCP) listed. A PCP is important for regular checkups, managing health problems, and getting care when you need it.     You can easily find a PCP and schedule an appointment online by clicking this link:  Find a Primary Care Provider     Thank you for choosing [facility]    She was encouraged to establish with a PCP to utilize this service    Subjective   This is a 34 year old patient consenting to an on demand video visit.  Patient has no current PCP Link will be provided within her chart  Patient has complaints of: Worsening